# Patient Record
Sex: MALE | Race: WHITE | NOT HISPANIC OR LATINO | URBAN - METROPOLITAN AREA
[De-identification: names, ages, dates, MRNs, and addresses within clinical notes are randomized per-mention and may not be internally consistent; named-entity substitution may affect disease eponyms.]

---

## 2017-01-09 ENCOUNTER — AMBULATORY - HEALTHEAST (OUTPATIENT)
Dept: SURGERY | Facility: CLINIC | Age: 32
End: 2017-01-09

## 2017-01-09 DIAGNOSIS — Z98.1 S/P LUMBAR SPINAL FUSION: ICD-10-CM

## 2017-01-11 ENCOUNTER — ANESTHESIA - HEALTHEAST (OUTPATIENT)
Dept: SURGERY | Facility: HOSPITAL | Age: 32
End: 2017-01-11

## 2017-01-11 ASSESSMENT — MIFFLIN-ST. JEOR: SCORE: 2107.92

## 2017-01-12 ENCOUNTER — SURGERY - HEALTHEAST (OUTPATIENT)
Dept: SURGERY | Facility: HOSPITAL | Age: 32
End: 2017-01-12

## 2017-01-17 ENCOUNTER — COMMUNICATION - HEALTHEAST (OUTPATIENT)
Dept: PHYSICAL MEDICINE AND REHAB | Facility: CLINIC | Age: 32
End: 2017-01-17

## 2017-01-23 ENCOUNTER — AMBULATORY - HEALTHEAST (OUTPATIENT)
Dept: PHYSICAL MEDICINE AND REHAB | Facility: CLINIC | Age: 32
End: 2017-01-23

## 2017-01-23 ENCOUNTER — COMMUNICATION - HEALTHEAST (OUTPATIENT)
Dept: PHYSICAL MEDICINE AND REHAB | Facility: CLINIC | Age: 32
End: 2017-01-23

## 2017-01-23 DIAGNOSIS — G89.18 POST-OPERATIVE PAIN: ICD-10-CM

## 2017-01-23 DIAGNOSIS — Z98.1 S/P LUMBAR FUSION: ICD-10-CM

## 2017-01-31 ENCOUNTER — HOSPITAL ENCOUNTER (OUTPATIENT)
Dept: PHYSICAL MEDICINE AND REHAB | Facility: CLINIC | Age: 32
Discharge: HOME | End: 2017-01-31
Attending: PHYSICIAN ASSISTANT

## 2017-01-31 DIAGNOSIS — Z98.1 S/P LUMBAR FUSION: ICD-10-CM

## 2017-01-31 DIAGNOSIS — M54.16 LUMBAR RADICULOPATHY: ICD-10-CM

## 2017-02-24 ENCOUNTER — AMBULATORY - HEALTHEAST (OUTPATIENT)
Dept: PHYSICAL MEDICINE AND REHAB | Facility: CLINIC | Age: 32
End: 2017-02-24

## 2017-03-03 ENCOUNTER — COMMUNICATION - HEALTHEAST (OUTPATIENT)
Dept: PHYSICAL MEDICINE AND REHAB | Facility: CLINIC | Age: 32
End: 2017-03-03

## 2017-03-03 DIAGNOSIS — M54.16 LUMBAR RADICULOPATHY: ICD-10-CM

## 2017-03-07 ENCOUNTER — HOSPITAL ENCOUNTER (OUTPATIENT)
Dept: PHYSICAL MEDICINE AND REHAB | Facility: CLINIC | Age: 32
Discharge: HOME | End: 2017-03-07
Attending: PHYSICIAN ASSISTANT

## 2017-03-07 DIAGNOSIS — M54.16 LUMBAR RADICULOPATHY: ICD-10-CM

## 2017-03-07 ASSESSMENT — MIFFLIN-ST. JEOR: SCORE: 2230.39

## 2017-03-29 ENCOUNTER — COMMUNICATION - HEALTHEAST (OUTPATIENT)
Dept: PHYSICAL MEDICINE AND REHAB | Facility: CLINIC | Age: 32
End: 2017-03-29

## 2017-03-29 DIAGNOSIS — G89.18 POST-OP PAIN: ICD-10-CM

## 2017-04-10 ENCOUNTER — AMBULATORY - HEALTHEAST (OUTPATIENT)
Dept: PHYSICAL MEDICINE AND REHAB | Facility: CLINIC | Age: 32
End: 2017-04-10

## 2017-04-18 ENCOUNTER — HOSPITAL ENCOUNTER (OUTPATIENT)
Dept: PHYSICAL MEDICINE AND REHAB | Facility: CLINIC | Age: 32
Discharge: HOME | End: 2017-04-18
Attending: PHYSICIAN ASSISTANT

## 2017-04-18 DIAGNOSIS — G89.18 POST-OPERATIVE PAIN: ICD-10-CM

## 2017-04-18 ASSESSMENT — MIFFLIN-ST. JEOR: SCORE: 2239.47

## 2017-04-26 ENCOUNTER — COMMUNICATION - HEALTHEAST (OUTPATIENT)
Dept: PHYSICAL MEDICINE AND REHAB | Facility: CLINIC | Age: 32
End: 2017-04-26

## 2017-04-26 DIAGNOSIS — G89.18 POST-OP PAIN: ICD-10-CM

## 2017-05-30 ENCOUNTER — COMMUNICATION - HEALTHEAST (OUTPATIENT)
Dept: PHYSICAL MEDICINE AND REHAB | Facility: CLINIC | Age: 32
End: 2017-05-30

## 2017-05-31 ENCOUNTER — AMBULATORY - HEALTHEAST (OUTPATIENT)
Dept: PHYSICAL MEDICINE AND REHAB | Facility: CLINIC | Age: 32
End: 2017-05-31

## 2017-05-31 ENCOUNTER — COMMUNICATION - HEALTHEAST (OUTPATIENT)
Dept: PHYSICAL MEDICINE AND REHAB | Facility: CLINIC | Age: 32
End: 2017-05-31

## 2017-05-31 DIAGNOSIS — G89.18 POST-OP PAIN: ICD-10-CM

## 2017-06-05 ENCOUNTER — AMBULATORY - HEALTHEAST (OUTPATIENT)
Dept: PHYSICAL MEDICINE AND REHAB | Facility: CLINIC | Age: 32
End: 2017-06-05

## 2017-06-09 ENCOUNTER — AMBULATORY - HEALTHEAST (OUTPATIENT)
Dept: PHYSICAL MEDICINE AND REHAB | Facility: CLINIC | Age: 32
End: 2017-06-09

## 2017-06-15 ENCOUNTER — HOSPITAL ENCOUNTER (OUTPATIENT)
Dept: PHYSICAL MEDICINE AND REHAB | Facility: CLINIC | Age: 32
Discharge: HOME | End: 2017-06-15
Attending: PHYSICIAN ASSISTANT

## 2017-06-15 DIAGNOSIS — M53.3 SI (SACROILIAC) JOINT DYSFUNCTION: ICD-10-CM

## 2017-06-15 RX ORDER — DIAZEPAM 5 MG
5 TABLET ORAL EVERY 6 HOURS PRN
Status: SHIPPED | COMMUNITY
Start: 2017-06-15

## 2017-06-15 RX ORDER — LORAZEPAM 1 MG/1
1 TABLET ORAL 2 TIMES DAILY
Status: SHIPPED | COMMUNITY
Start: 2017-06-15

## 2017-06-15 RX ORDER — HYDROCODONE BITARTRATE AND ACETAMINOPHEN 5; 325 MG/1; MG/1
1 TABLET ORAL PRN
Status: SHIPPED | COMMUNITY
Start: 2017-06-15

## 2017-06-15 ASSESSMENT — MIFFLIN-ST. JEOR: SCORE: 2184.57

## 2017-08-16 ENCOUNTER — COMMUNICATION - HEALTHEAST (OUTPATIENT)
Dept: PHYSICAL MEDICINE AND REHAB | Facility: CLINIC | Age: 32
End: 2017-08-16

## 2017-08-16 DIAGNOSIS — Z98.1 S/P LUMBAR FUSION: ICD-10-CM

## 2017-08-16 RX ORDER — IBUPROFEN 200 MG
1 CAPSULE ORAL 2 TIMES DAILY
Qty: 60 TABLET | Refills: 4 | Status: SHIPPED | OUTPATIENT
Start: 2017-08-16

## 2018-08-09 ENCOUNTER — COMMUNICATION - HEALTHEAST (OUTPATIENT)
Dept: PHYSICAL MEDICINE AND REHAB | Facility: CLINIC | Age: 33
End: 2018-08-09

## 2021-05-30 VITALS — BODY MASS INDEX: 36.51 KG/M2 | HEIGHT: 70 IN | WEIGHT: 255 LBS

## 2021-05-30 VITALS — WEIGHT: 284 LBS | BODY MASS INDEX: 40.66 KG/M2 | HEIGHT: 70 IN

## 2021-05-30 VITALS — HEIGHT: 70 IN | BODY MASS INDEX: 40.37 KG/M2 | WEIGHT: 282 LBS

## 2021-05-31 VITALS — HEIGHT: 70 IN | WEIGHT: 273 LBS | BODY MASS INDEX: 39.08 KG/M2

## 2021-06-08 NOTE — PROGRESS NOTES
CC:    History:  Mr Rogers is 2 weeks post tlif L5-S1.  He's had good relief in his lower extremity pain.  He says some low back pain is his be resolving with time.  No problems with his incision.      Exam:  Alter and oriented x 3.  vss afebrile    Incision: clean/dry/intact without evidence of infection    Neuro:     Motors  5/5 throughout w/o deficit    Sensory  Equal normal bilaterally    Imaginv lumbar xrays were reviewed.  My impression is that the interbody device is in good placement.  The hardware is well placed.  No motion or migration of graft or hardware.      A/P    2 weeks tlif L5-S1. Low back pain  -Mr. Rogers is doing quite well.  We'll see him back in 4 weeks for his 6 week postoperative visit.  We'll likely add some physical therapy in 4 weeks.

## 2021-06-08 NOTE — ANESTHESIA CARE TRANSFER NOTE
Last vitals:   Vitals:    01/12/17 1530   BP: 163/81   Pulse: (!) 126   Resp: 20   Temp: 36.7  C (98  F)   SpO2: 99%     Patient's level of consciousness is drowsy  Spontaneous respirations: yes  Maintains airway independently: yes  Dentition unchanged: yes  Oropharynx: oropharynx clear of all foreign objects    QCDR Measures:  ASA# 20 - Surgical Safety Checklist: ASA20A - Safety Checks Done  PQRS# 430 - Adult PONV Prevention: 4558F - Pt received => 2 anti-emetic agents (different classes) preop & intraop  ASA# 8 - Peds PONV Prevention: NA - Not pediatric patient, not GA or 2 or more risk factors NOT present  PQRS# 424 - Priscila-op Temp Management: 4559F - At least one body temp DOCUMENTED => 35.5C or 95.9F within required timeframe  PQRS# 426 - PACU Transfer Protocol: - Transfer of care checklist used  ASA# 14 - Acute Post-op Pain: ASA14B - Patient did NOT experience pain >= 7 out of 10    I completed my SBAR handoff to the receiving nurse per policy and procedure.

## 2021-06-08 NOTE — ANESTHESIA POSTPROCEDURE EVALUATION
Patient: Elkin Rogers  MINIMALLY INVASIVE POSTERIOR FUSION WITH TRANSFORAMINAL LUMBAR INTERBODY FUSION L5-S1 BILATERAL , POSTERIOR FACETECTOMY L5-S1 LEFT   Anesthesia type: general    Patient location: PACU  Last vitals:   Vitals:    01/12/17 1705   BP: (!) 151/107   Pulse: 100   Resp: 16   Temp: 36.7  C (98.1  F)   SpO2: 100%     Post vital signs: stable  Level of consciousness: awake and responds to simple questions  Post-anesthesia pain: pain controlled  Post-anesthesia nausea and vomiting: no  Pulmonary: unassisted, return to baseline  Cardiovascular: stable and blood pressure at baseline  Hydration: adequate  Anesthetic events: no    QCDR Measures:  ASA# 11 - Priscila-op Cardiac Arrest: ASA11B - Patient did NOT experience unanticipated cardiac arrest  ASA# 12 - Priscila-op Mortality Rate: ASA12B - Patient did NOT die  ASA# 13 - PACU Re-Intubation Rate: ASA13B - Patient did NOT require a new airway mgmt  ASA# 10 - Composite Anes Safety: ASA10A - No serious adverse event  ASA# 38 - New Corneal Injury: ASA38A - No new exposure keratitis or corneal abrasion in PACU    Additional Notes:

## 2021-06-08 NOTE — ANESTHESIA PREPROCEDURE EVALUATION
Anesthesia Evaluation      Patient summary reviewed   No history of anesthetic complications     Airway   Mallampati: II  Neck ROM: full   Pulmonary - negative ROS and normal exam   (+) sleep apnea (Sleep study inconclusive),                          Cardiovascular - negative ROS and normal exam  Exercise tolerance: > or = 10 METS  (+) hypertension (Borderline HTN), ,     (-) past MI  ECG reviewed        Neuro/Psych    (+) seizures (Side effect of Wellbutrin 1.5 years ago), anxiety/panic attacks,     Endo/Other    (+) obesity,   (-) no diabetes     GI/Hepatic/Renal - negative ROS   (-) GERD, impaired hepatic function, renal disease     Other findings: Hgb 13.9, Plts 276K, K 4.7, Cr 1.01      Dental    (+) caps                       Anesthesia Plan  Planned anesthetic: general endotracheal  Soft molar bite block  Glidescope intubation  Sux available for intubation  Propofol 2 syringes for induction  ASA 3   Induction: intravenous   Anesthetic plan and risks discussed with: patient  Anesthesia plan special considerations: video-assisted, antiemetics, IV therapy two IVs,   Post-op plan: routine recovery

## 2021-06-09 NOTE — PROGRESS NOTES
History:    Mr Rogers is a pleasant 31-year-old gentleman who is post a T left at L5-S1.  He is doing fairly well he's been himself down to just 3-4 Norco a day.  He is developed new left anterior lateral thigh burning sensations.  Pain is made worse with prolonged standing.  No significant injury that he can recall although he states he had a stomach virus was doing quite a bit of vomiting.  He believes this preceded his new pain.  No pain distal to his knee.  No lower extremity numbness or weakness    Exam:  Alter and oriented x 3.  vss afebrile    Incision: clean/dry/intact without evidence of infection    Neuro:     Motors  5/5 throughout w/o deficit    Sensory  Equal normal bilaterally    Imaginv lumbar xrays were reviewed with the patient  -5 lumbar vertebral bodies.  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  Pedicle screws at L5-S1 or great placement.  Interbody graft is anterior.    A/P  6 weeks post tlif L5-S1.  Left LE pain  -I discussed with Mr. Rogers that his left leg pain may be related to his presurgical neural compression we will start him on a Medrol pack if his pain does not improve over the next 4 weeks he will let me know.  Further imaging could be considered.  Otherwise I will see him back in 6 weeks.  I will have him obtain updated x-rays prior to his visit.  Should the patient have any future problems/questions/concerns they will let me know.

## 2021-06-10 NOTE — PROGRESS NOTES
History:   Mr Rogers is a pleasant 31-year-old gentleman who is post a TLIF at L5-S1. He is doing fairly well he's been himself down to just apap.  He intermittently will note right lower extremity paresthesias.  It was quite severe roughly a week ago.  Now completely resolve for him.  No new injuries.     Exam:  Alter and oriented x 3. vss afebrile     Incision: clean/dry/intact without evidence of infection     Neuro:      Motors  5/5 throughout w/o deficit     Sensory  Equal normal bilaterally     Imaginv lumbar xrays were reviewed with the patient  - hardware is in excellent placement and no motion migration stable adjacent level spondylosis is noted no interval changes.  The appearance of developing fusion present.     A/P  12 weeks post tlif L5-S1.   -Mr Rogers is doing quite well see him back in 3 months.  He will continue increase it as tolerated.  Should his leg pain come back we discussed further imaging could be considered.  Should the patient have any future problems/questions/concerns they will let me know

## 2021-06-11 NOTE — PROGRESS NOTES
"SPINE SURGERY FOLLOWUP  NOTE    Mr Rogers is a pleasant 31-year-old gentleman who is post a TLIF at L5-S1. He is doing fairly well.  He is here today to review a CT scan his lumbar spine.  There is been episodes of significant lumbosacral pain on the right and right groin pain.  He typically attributes it to stepping on uneven ground.  There is weeks ago by where he is nearly asymptomatic.  He is trying to increase his activities.  Due to his new and worsening lumbosacral pain and occasional right leg pain a CT scan lumbar spine was obtained and is here today to follow-up on that.         The pts PMH, PSH, ROS, Meds, Allergies, SH, FH are all unchanged and summarized in the pts health history from last visit         PHYSICAL EXAM:   Constitutional: /87 (Patient Site: Right Arm, Patient Position: Sitting)  Pulse 69  Ht 5' 10\" (1.778 m)  Wt (!) 273 lb (123.8 kg)  BMI 39.17 kg/m2     Mental Status: A & O in no acute distress.  Affect is appropriate.  Speech is fluent.  Recent and remote memory are intact.  Attention span and concentration are normal.        Motor: No pronator drift of upper extremity. Normal bulk and tone all muscle groups of upper and lower extremities.    Musculoskeletal: Tender to palpation lumbar spine.  ROM limited in all directions.  ttp lumbar spine/SI joint right.      Sensory: Sensation intact bilaterally to light touch.      Coordination:   Heel/toe/ gait intact.  nml tandem gait      Reflexes; supinator, biceps, triceps, knee/ ankle jerk intact.  neg hoffmans/   neg babinski/ clonus.    IMAGING:   I personally reviewed all radiographic images   Ct lumbar: Some areas of consolidation within the interbody at L5-S1 hardware is intact and stable no significant adjacent level pathology     CONSULTATION ASSESSMENT AND PLAN:    5 months post tlif L5-S1. Nearly solid fusion. 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  We discussed treatment options such as physical therapy chiropractic care and SI joint injections.  At this point his preference is to monitor his symptoms moving forward he states he is reassured that his lumbar spine is unremarkable.  I would see him back at the one-year chandni.    I spent more than 25 minutes in this apt, examining the pt, reviewing the scans, reviewing notes from chart, discussing treatment options with risks and benefits and coordinating care. >50 % clinic time was spent in face to face counseling and coordinating care    Larry Byrnes  /Dr. Gracie MD      CC:     No Primary Care Provider  [address] [place] [address]

## 2021-06-15 PROBLEM — M54.16 LUMBAR RADICULOPATHY: Status: ACTIVE | Noted: 2017-01-12
